# Patient Record
Sex: MALE | Race: WHITE | NOT HISPANIC OR LATINO | Employment: FULL TIME | URBAN - NONMETROPOLITAN AREA
[De-identification: names, ages, dates, MRNs, and addresses within clinical notes are randomized per-mention and may not be internally consistent; named-entity substitution may affect disease eponyms.]

---
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
## 2023-03-17 ENCOUNTER — OFFICE VISIT (OUTPATIENT)
Dept: FAMILY MEDICINE CLINIC | Facility: CLINIC | Age: 18
End: 2023-03-17
Payer: MEDICAID

## 2023-03-17 VITALS
TEMPERATURE: 96 F | BODY MASS INDEX: 31.23 KG/M2 | DIASTOLIC BLOOD PRESSURE: 72 MMHG | WEIGHT: 211.5 LBS | OXYGEN SATURATION: 98 % | HEART RATE: 83 BPM | SYSTOLIC BLOOD PRESSURE: 138 MMHG

## 2023-03-17 DIAGNOSIS — G47.39 OTHER SLEEP APNEA: Primary | ICD-10-CM

## 2023-03-17 PROCEDURE — 99213 OFFICE O/P EST LOW 20 MIN: CPT | Performed by: STUDENT IN AN ORGANIZED HEALTH CARE EDUCATION/TRAINING PROGRAM

## 2023-03-17 PROCEDURE — 1160F RVW MEDS BY RX/DR IN RCRD: CPT | Performed by: STUDENT IN AN ORGANIZED HEALTH CARE EDUCATION/TRAINING PROGRAM

## 2023-03-17 PROCEDURE — 1159F MED LIST DOCD IN RCRD: CPT | Performed by: STUDENT IN AN ORGANIZED HEALTH CARE EDUCATION/TRAINING PROGRAM

## 2023-03-17 NOTE — PROGRESS NOTES
Family Medicine Residency  Shaheed Valentin MD    Subjective:     Blaine Reyes is a 17 y.o. male who presents for partial seizure rule out and sleep apnea.    On 12 th March 2023 at 0400hrs, pt was snoring and breathing heavy. Pt partner tried wake patient and he woke up a couple of minutes later with no adverse affects. Pt reports in his dream and felt he was having a shaking event. Possibility of night terror or nightmare. Pt has no history of seizure and patient does not feel that this was a seizure. Wife has concern that he was having an event. However is in agreement that this maybe a bad dream. Pt and wife have concern for sleep apnea. Pt and his wife would like referral onto Sleep for review.  This is the first time any incident of this nature has happened.  The patient and wife are not sure whether it was a seizure or sleep related incident. Pt denies fever, headache, dizziness, no LOC.  Patient denies use of alcohol or other substances.    Patient's wife and patient would like this incident documented just in case patient has symptoms like this happen in the future and they would like referral onto sleep study for sleep apnea today.    Patient started a new job working welding.  Educated patient in relation to safety associated with his job type    The following portions of the patient's history were reviewed and updated as appropriate: allergies, current medications, past family history, past medical history, past social history, past surgical history and problem list.    Past Medical Hx:  Past Medical History:   Diagnosis Date   • ADHD (attention deficit hyperactivity disorder)    • Allergic rhinitis    • Allergic rhinitis due to pollen    • Bacterial infection of skin     upper lip   • Bronchitis    • Cellulitis of earlobe    • Common cold    • Contact dermatitis    • Cough    • Diarrhea    • Encounter for removal of sutures    • Ingrown toenail    • Knee pain, right    • Laceration of sole of  foot    • Nasal congestion    • Nausea & vomiting    • Obstructive sleep apnea syndrome    • Open wound of toe with complication    • Pain in throat    • Streptococcal pharyngitis    • Tick bite    • Upper respiratory infection    • Vomiting        Past Surgical Hx:  Past Surgical History:   Procedure Laterality Date   • ADENOIDECTOMY     • TONSILLECTOMY     • TONSILLECTOMY AND ADENOIDECTOMY  04/26/2010       Current Meds:    Current Outpatient Medications:   •  brompheniramine-pseudoephedrine-DM (Bromfed DM) 30-2-10 MG/5ML syrup, Take 5 mL by mouth Every 4 (Four) Hours As Needed for Allergies (during the day)., Disp: 120 mL, Rfl: 0  •  loperamide (IMODIUM A-D) 2 MG tablet, Take 1 tablet by mouth 4 (Four) Times a Day As Needed for Diarrhea., Disp: 30 tablet, Rfl: 0  •  ondansetron ODT (ZOFRAN-ODT) 4 MG disintegrating tablet, Place 1 tablet on the tongue Every 8 (Eight) Hours As Needed for Nausea or Vomiting., Disp: 15 tablet, Rfl: 0    Allergies:  Allergies   Allergen Reactions   • Latex        Family Hx:  Family History   Problem Relation Age of Onset   • Cancer Other    • Diabetes Other    • Heart disease Other    • Hypertension Mother    • Depression Mother    • Hypertension Father    • No Known Problems Sister    • No Known Problems Sister    • No Known Problems Brother         Social History:  Social History     Socioeconomic History   • Marital status: Single   Tobacco Use   • Smoking status: Never   • Smokeless tobacco: Never   Substance and Sexual Activity   • Alcohol use: No   • Drug use: No   • Sexual activity: Defer       Review of Systems  Review of Systems   Constitutional: Negative for activity change, appetite change, chills, diaphoresis, fatigue and fever.   HENT: Negative for congestion, dental problem, ear discharge, ear pain, hearing loss, mouth sores, nosebleeds, postnasal drip, sinus pain, sore throat, tinnitus, trouble swallowing and voice change.    Eyes: Negative for photophobia, pain,  discharge and itching.   Respiratory: Negative for cough, choking, chest tightness, shortness of breath and wheezing.    Cardiovascular: Negative for chest pain, palpitations and leg swelling.   Gastrointestinal: Negative for abdominal distention, abdominal pain, blood in stool, constipation, diarrhea, nausea and vomiting.   Endocrine: Negative for cold intolerance and heat intolerance.   Genitourinary: Negative for difficulty urinating, dysuria, flank pain and hematuria.   Musculoskeletal: Negative for back pain, joint swelling and neck pain.   Skin: Negative for color change and rash.   Neurological: Negative for dizziness, tremors, seizures, weakness, light-headedness, numbness and headaches.   Hematological: Negative for adenopathy.   Psychiatric/Behavioral: Negative for behavioral problems, confusion, hallucinations, self-injury and sleep disturbance.       Objective:     /72   Pulse 83   Temp 96 °F (35.6 °C)   Wt 95.9 kg (211 lb 8 oz)   SpO2 98%   BMI 31.23 kg/m²   Physical Exam  Vitals and nursing note reviewed.   Constitutional:       Appearance: Normal appearance. He is not ill-appearing or diaphoretic.   HENT:      Head: Normocephalic and atraumatic.      Right Ear: External ear normal.      Left Ear: External ear normal.      Nose: Nose normal. No rhinorrhea.      Mouth/Throat:      Mouth: Mucous membranes are moist.      Pharynx: No posterior oropharyngeal erythema.   Eyes:      General: No scleral icterus.        Right eye: No discharge.         Left eye: No discharge.      Extraocular Movements: Extraocular movements intact.   Neck:      Vascular: No carotid bruit.   Cardiovascular:      Rate and Rhythm: Normal rate and regular rhythm.      Pulses: Normal pulses.      Heart sounds: Normal heart sounds.     No gallop.   Pulmonary:      Effort: Pulmonary effort is normal.      Breath sounds: Normal breath sounds. No wheezing.   Chest:      Chest wall: No tenderness.   Abdominal:      General:  Bowel sounds are normal.      Palpations: Abdomen is soft.      Tenderness: There is no rebound.   Musculoskeletal:         General: No swelling.      Cervical back: No muscular tenderness.      Right lower leg: No edema.      Left lower leg: No edema.   Lymphadenopathy:      Cervical: No cervical adenopathy.   Skin:     General: Skin is warm and dry.      Capillary Refill: Capillary refill takes less than 2 seconds.      Findings: No erythema or rash.   Neurological:      General: No focal deficit present.      Mental Status: He is alert and oriented to person, place, and time.      Motor: No weakness.   Psychiatric:         Mood and Affect: Mood normal.         Behavior: Behavior normal.         Thought Content: Thought content normal.         Judgment: Judgment normal.          Assessment   Blaine Reyes is a 17 y.o. male who presents for:    Sleep apnea:  Patient referred on to sleep study 3/17/2023  Educated patient relation to sleep apnea.    Possible partial complex seizure:  Educated patient and patient's wife in the signs of a partial complex seizure  Educated patient and patient's wife in the plan of care should this incident occur  Educated patient in relation to plan should he have a seizure     Patient and patient's wife fully understood the plan of care at this point of time.  Patient and patient's wife happy with this plan of care at this point in time and have no concerns.    New employment status:  Patient started a new job working welding.  Educated patient in relation to safety associated with his job type.     The patient was educated on the risks, benefits and alternatives to therapy. The patient was in agreement with therapy and plan of care.    Plan     Next visit Annual physical  Next visit check if attended sleep study  Next visit if patient reports seizure-like activity referral to neurology  If in the future has seizure-like activity order MRI brain  Next visit check patients  medication regime.  Next visit check patients compliance to medication regime.    Diagnoses and all orders for this visit:    1. Other sleep apnea (Primary)  -     Ambulatory Referral to Sleep Medicine      · Rx changes: As above  · Patient Education: Advised and educated the patient in relation to diet, lifestyle, healthcare goals and educated in relation to sleep apnea and seizures.  · Compliance at present is estimated to be good.   · Efforts to improve compliance (if necessary) will be directed at increased exercise.    Depression screening: Depression screening performed today; result negative; no follow up needed       PHQ-2 Depression Screening  Little interest or pleasure in doing things? 0-->not at all   Feeling down, depressed, or hopeless? 0-->not at all   PHQ-2 Total Score 0        PHQ-2 Total Score: 0      PHQ-9 Depression Screening  Little interest or pleasure in doing things? 0-->not at all   Feeling down, depressed, or hopeless? 0-->not at all   Trouble falling or staying asleep, or sleeping too much?     Feeling tired or having little energy?     Poor appetite or overeating?     Feeling bad about yourself - or that you are a failure or have let yourself or your family down?     Trouble concentrating on things, such as reading the newspaper or watching television?     Moving or speaking so slowly that other people could have noticed? Or the opposite - being so fidgety or restless that you have been moving around a lot more than usual?     Thoughts that you would be better off dead, or of hurting yourself in some way?     PHQ-9 Total Score 0   If you checked off any problems, how difficult have these problems made it for you to do your work, take care of things at home, or get along with other people?        PHQ-9 Total Score: 0      Patient has no suicidal, no homicidal ideation.    Patient has been offered psychiatric and psychological therapy input however has refused.    Follow-up:     Return in  about 3 weeks (around 4/7/2023) for Annual.    Preventative:  Health Maintenance   Topic Date Due   • ANNUAL PHYSICAL  08/02/2019   • INFLUENZA VACCINE  03/31/2023 (Originally 8/1/2022)   • COVID-19 Vaccine (1) 06/14/2023 (Originally 6/16/2004)   • HEPATITIS C SCREENING  06/23/2023 (Originally 4/27/2017)   • HPV VACCINES (2 - Male 2-dose series) 06/23/2023 (Originally 2/2/2019)   • TDAP/TD VACCINES (2 - Td or Tdap) 09/03/2025   • Pneumococcal Vaccine 0-64  Aged Out   • MENINGOCOCCAL VACCINE  Aged Out     Male Preventative: Patient does Testicular self exam  Recommended: none  Vaccine Counseling: N/A    Weight  -Class: Obese Class I: 30-34.9kg/m2  -BMI is >= 30 and <35. (Class 1 Obesity). The following options were offered after discussion;: weight loss educational material (shared in after visit summary), exercise counseling/recommendations, nutrition counseling/recommendations, pharmacological intervention options and referral to a nutritionist   eat more fruits and vegetables, decrease soda or juice intake, increase water intake, increase physical activity, reduce screen time, reduce portion size, cut out extra servings and reduce fast food intake    Alcohol use:  reports no history of alcohol use.  Nicotine status  reports that he has never smoked. He has never used smokeless tobacco.     Goals    None         RISK SCORE: 1    Aníbal request # [id number], Aníbal out of service.     Signature  [place] [address]  Office: [phone]      This document has been electronically signed by Shaheed Valentin MD on March 17, 2023 10:40 CDT      Portions of this note were copied from progress note written by Dr. Valentin; note reviewed and updated as appropriate.    Part of this note may be an electronic transcription/translation of spoken language to printed text using the Dragon Dictation System.

## 2023-03-17 NOTE — PROGRESS NOTES
I have spoken with the patient .  I have reviewed the notes, assessments, and/or procedures performed by Dr. Shaheed Valentin,   I concur with   his  documentation and assessment and plan for Blaine Reyes.          This document has been electronically signed by Rui Holloway MD on March 17, 2023 11:01 CDT

## 2023-04-11 ENCOUNTER — OFFICE VISIT (OUTPATIENT)
Dept: SLEEP MEDICINE | Facility: HOSPITAL | Age: 18
End: 2023-04-11
Payer: MEDICAID

## 2023-04-11 VITALS
DIASTOLIC BLOOD PRESSURE: 91 MMHG | HEIGHT: 69 IN | WEIGHT: 217 LBS | HEART RATE: 71 BPM | OXYGEN SATURATION: 98 % | SYSTOLIC BLOOD PRESSURE: 154 MMHG | BODY MASS INDEX: 32.14 KG/M2

## 2023-04-11 DIAGNOSIS — G47.19 EXCESSIVE DAYTIME SLEEPINESS: ICD-10-CM

## 2023-04-11 DIAGNOSIS — G25.81 RESTLESS LEGS SYNDROME: ICD-10-CM

## 2023-04-11 DIAGNOSIS — R06.83 SNORING: Primary | ICD-10-CM

## 2023-04-11 DIAGNOSIS — G47.00 FREQUENT NOCTURNAL AWAKENING: ICD-10-CM

## 2023-04-11 PROCEDURE — 1159F MED LIST DOCD IN RCRD: CPT | Performed by: NURSE PRACTITIONER

## 2023-04-11 PROCEDURE — 1160F RVW MEDS BY RX/DR IN RCRD: CPT | Performed by: NURSE PRACTITIONER

## 2023-04-11 PROCEDURE — 99213 OFFICE O/P EST LOW 20 MIN: CPT | Performed by: NURSE PRACTITIONER

## 2023-04-11 NOTE — PROGRESS NOTES
New Patient Sleep Medicine Consultation    Encounter Date: 4/11/2023         Patient's Primary Care Provider: Shaheed Valentin MD  Referring Provider: Shaheed Valentin MD  Reason for consultation/chief complaint: snoring, excessive daytime sleepiness and unrefreshing sleep    Blaine Reyes is a 17 y.o. male who admits to snoring, unrestful sleep, working night shift or rotating shifts, excessive daytime sleepiness, morning headaches, sleeping less than 6 hours per night, up to bathroom at night, restless legs at night and difficulty falling asleep.    He denies cataplexy, sleep paralysis, or hypnagogic hallucinations. His bedtime is ~ 0100. He  falls asleep after 1-5 minutes, and is up 1-2 times per night. He wakes up ~ 6129-4849. He endorses 6-8 hours of sleep. He drinks 0 cups of coffee, 2-3 teas, and 2-3 sodas per day. He drinks 0 alcoholic beverages per week. He is not a current smoker. He does not take sedatives or hypnotics. He has no sleepiness with driving. He does not nap.     Harwood - 9  Harwood Sleepiness Scale  Sitting and reading: Moderate chance of dozing  Watching TV: Moderate chance of dozing  Sitting, inactive in a public place (e.g. a theatre or a meeting): Would never doze  As a passenger in a car for an hour without a break: Slight chance of dozing  Lying down to rest in the afternoon when circumstances permit: High chance of dozing  Sitting and talking to someone: Would never doze  Sitting quietly after a lunch without alcohol: Slight chance of dozing  In a car, while stopped for a few minutes in traffic: Would never doze  Total score: 9    Prior Sleep Testing: None    Past Medical History:   Diagnosis Date   • ADHD (attention deficit hyperactivity disorder)    • Allergic rhinitis    • Allergic rhinitis due to pollen    • Bacterial infection of skin     upper lip   • Bronchitis    • Cellulitis of earlobe    • Common cold    • Contact dermatitis    • Cough    • Diarrhea    • Encounter  for removal of sutures    • Ingrown toenail    • Knee pain, right    • Laceration of sole of foot    • Nasal congestion    • Nausea & vomiting    • Obstructive sleep apnea syndrome    • Open wound of toe with complication    • Pain in throat    • Streptococcal pharyngitis    • Tick bite    • Upper respiratory infection    • Vomiting      Social History     Socioeconomic History   • Marital status: Single   Tobacco Use   • Smoking status: Never   • Smokeless tobacco: Never   Substance and Sexual Activity   • Alcohol use: No   • Drug use: No   • Sexual activity: Defer     Family History   Problem Relation Age of Onset   • Cancer Other    • Diabetes Other    • Heart disease Other    • Hypertension Mother    • Depression Mother    • Hypertension Father    • No Known Problems Sister    • No Known Problems Sister    • No Known Problems Brother      Prior T&A, UPPP, maxillofacial, or bariatric surgery: Tonsillectomy, adenoidectomy  Family history of sleep disorders: Maternal grandmother - ?ANNA  Other family history + for: As above  Occupation: [employer]  Marital status: Unmarried  Has 0 brothers and 2 sisters  Smoking history: smoked never    Review of Systems:  Constitutional: positive for fatigue  Ears, nose, mouth, throat, and face: negative  Respiratory: negative  Cardiovascular: negative  Gastrointestinal: negative  Genitourinary:negative  Musculoskeletal:negative  Neurological: negative  Behavioral/Psych: positive for fatigue and sleep disturbance  Patient advised to discuss any positive ROS with PCP.      Vitals:    04/11/23 1409   BP: 154/91   Pulse: 71   SpO2: 98%           04/11/23  1409   Weight: 98.4 kg (217 lb)       Body mass index is 32.03 kg/m². BMI is >= 30 and <35. (Class 1 Obesity). The following options were offered after discussion;: referral to primary care    Tobacco Use: Low Risk    • Smoking Tobacco Use: Never   • Smokeless Tobacco Use: Never   • Passive Exposure: Not on file       Physical Exam:       "  General: Alert. Cooperative. Well developed. No acute distress.   Head/Neck:  Normocephalic. Symmetrical. Atraumatic.     Neck circumference: 17\"             Eyes: Sclera clear. No icterus. PERRLA. Normal EOM.             Ears: No deformities. Normal hearing.             Nose: No septal deviation. No drainage.          Throat: No oral lesions. No thrush. Moist mucous membranes. Trachea midline.    Tongue is normal     Dentition is good       Pharynx: Posterior pharyngeal pillars are narrow    Mallampati score of II (hard and soft palate, upper portion of tonsils anduvula visible)    Pharynx is nonerythematous, with both tonsils absent   Chest Wall:  Normal shape. Symmetric expansion with respiration. No tenderness.          Lungs:  Clear to auscultation bilaterally. No wheezes. No rhonchi. No rales. Respirations regular, even and unlabored.            Heart:  Regular rhythm and normal rate. Normal S1 and S2. No murmur.     Abdomen:  Soft, non-tender and non-distended. Normal bowel sounds. No masses.  Extremities:  Moves all extremities well. No edema.           Pulses: Pulses palpable and equal bilaterally.               Skin: Dry. Intact. No bleeding. No rash.           Neuro: Moves all 4 extremities and cranial nerves grossly intact.  Psychiatric: Normal mood and affect.      Current Outpatient Medications:   •  brompheniramine-pseudoephedrine-DM (Bromfed DM) 30-2-10 MG/5ML syrup, Take 5 mL by mouth Every 4 (Four) Hours As Needed for Allergies (during the day). (Patient not taking: Reported on 4/11/2023), Disp: 120 mL, Rfl: 0  •  loperamide (IMODIUM A-D) 2 MG tablet, Take 1 tablet by mouth 4 (Four) Times a Day As Needed for Diarrhea. (Patient not taking: Reported on 4/11/2023), Disp: 30 tablet, Rfl: 0  •  ondansetron ODT (ZOFRAN-ODT) 4 MG disintegrating tablet, Place 1 tablet on the tongue Every 8 (Eight) Hours As Needed for Nausea or Vomiting. (Patient not taking: Reported on 4/11/2023), Disp: 15 tablet, Rfl: " 0    WBC   Date Value Ref Range Status   02/04/2019 7.63 3.20 - 9.80 10*3/mm3 Final     RBC   Date Value Ref Range Status   02/04/2019 5.46 3.80 - 5.50 10*6/mm3 Final     Hemoglobin   Date Value Ref Range Status   02/04/2019 17.2 (H) 12.0 - 16.0 g/dL Final     Hematocrit   Date Value Ref Range Status   02/04/2019 48.3 36.0 - 50.0 % Final     MCV   Date Value Ref Range Status   02/04/2019 88.5 78.0 - 98.0 fL Final     MCH   Date Value Ref Range Status   02/04/2019 31.5 25.0 - 35.0 pg Final     MCHC   Date Value Ref Range Status   02/04/2019 35.6 31.0 - 37.0 g/dL Final     RDW   Date Value Ref Range Status   02/04/2019 13.0 11.5 - 14.5 % Final     RDW-SD   Date Value Ref Range Status   02/04/2019 41.9 35.1 - 43.9 fl Final     MPV   Date Value Ref Range Status   02/04/2019 11.4 8.0 - 12.0 fL Final     Platelets   Date Value Ref Range Status   02/04/2019 178 150 - 400 10*3/mm3 Final     Neutrophil %   Date Value Ref Range Status   02/04/2019 63.0 44.0 - 65.0 % Final     Lymphocyte %   Date Value Ref Range Status   02/04/2019 28.8 25.0 - 46.0 % Final     Monocyte %   Date Value Ref Range Status   02/04/2019 6.9 1.0 - 12.0 % Final     Eosinophil %   Date Value Ref Range Status   02/04/2019 1.0 0.0 - 9.0 % Final     Basophil %   Date Value Ref Range Status   02/04/2019 0.3 0.0 - 2.0 % Final     Neutrophils, Absolute   Date Value Ref Range Status   02/04/2019 4.80 1.80 - 7.20 10*3/mm3 Final     Lymphocytes, Absolute   Date Value Ref Range Status   02/04/2019 2.20 1.70 - 4.40 10*3/mm3 Final     Monocytes, Absolute   Date Value Ref Range Status   02/04/2019 0.53 0.10 - 0.90 10*3/mm3 Final     Eosinophils, Absolute   Date Value Ref Range Status   02/04/2019 0.08 0.00 - 0.70 10*3/mm3 Final     Basophils, Absolute   Date Value Ref Range Status   02/04/2019 0.02 0.00 - 0.20 10*3/mm3 Final     Lab Results   Component Value Date    GLUCOSE 103 (H) 02/04/2019    BUN 16 02/04/2019    CREATININE 0.96 02/04/2019    BCR 16.7 02/04/2019     K 4.0 02/04/2019    CO2 28.0 02/04/2019    CALCIUM 9.5 02/04/2019    ALBUMIN 5.10 (H) 02/04/2019    BILITOT 2.7 (H) 02/04/2019    AST 26 02/04/2019    ALT 20 02/04/2019       Contraindications to home sleep test: none    ASSESSMENT:  1. Excessive daytime sleepiness, presumed obstructive sleep apnea - New (to me), additional work-up planned (4)  1. Check home sleep study (regular)  2. Follow up for results  3. Pertinent labs were reviewed as listed above  2. Snoring, presumed obstructive sleep apnea - New (to me), additional work-up planned (4)  1. As above  3. Frequent nocturnal awakenings - New (to me), additional work-up planned (4)  1. As above  4. Restless leg syndrome/ Periodic limb movement disorder - (RLS/PLMD) - New (to me), additional work-up planned (4)  1. Address after further testing  2. Consider iron/ferritin level, consider Requip or Mirapex if iron/ferritin within range      I spent 30 minutes caring for Blaine on this date of service. This time includes time spent by me in the following activities: preparing for the visit, reviewing tests, obtaining and/or reviewing a separately obtained history, performing a medically appropriate examination and/or evaluation , counseling and educating the patient/family/caregiver, ordering medications, tests, or procedures, documenting information in the medical record and care coordination; discussing Further testing    RTC 2 weeks after testing. Patient agrees to return sooner if changes in symptoms.         This document has been electronically signed by POORNIMA Mancera on April 11, 2023 14:21 CDT          CC: Shaheed Valentin MD O'Reilly, Eric A, MD

## 2023-05-15 ENCOUNTER — HOSPITAL ENCOUNTER (OUTPATIENT)
Dept: SLEEP MEDICINE | Facility: HOSPITAL | Age: 18
Discharge: HOME | End: 2023-05-15
Admitting: NURSE PRACTITIONER
Payer: MEDICAID

## 2023-05-15 DIAGNOSIS — G47.19 EXCESSIVE DAYTIME SLEEPINESS: ICD-10-CM

## 2023-05-15 DIAGNOSIS — G47.00 FREQUENT NOCTURNAL AWAKENING: ICD-10-CM

## 2023-05-15 DIAGNOSIS — R06.83 SNORING: ICD-10-CM

## 2023-05-15 PROCEDURE — 95800 SLP STDY UNATTENDED: CPT

## 2023-05-25 ENCOUNTER — DOCUMENTATION (OUTPATIENT)
Dept: FAMILY MEDICINE CLINIC | Facility: CLINIC | Age: 18
End: 2023-05-25
Payer: MEDICAID

## 2023-05-25 NOTE — PROGRESS NOTES
Time of phone call: 16:23 CDT 5/25/2023       Blaine Reyes is a 17 y.o.  male  who I contacted in relation to their recent results.  The patient was unavailable by phone and was available by voicemail, pt has follow up appointment on 5/30/2023.       Signature  [place] [address]  Office: [phone]

## 2023-06-02 ENCOUNTER — OFFICE VISIT (OUTPATIENT)
Dept: FAMILY MEDICINE CLINIC | Facility: CLINIC | Age: 18
End: 2023-06-02

## 2023-06-02 VITALS
DIASTOLIC BLOOD PRESSURE: 86 MMHG | HEART RATE: 91 BPM | TEMPERATURE: 97.5 F | HEIGHT: 69 IN | BODY MASS INDEX: 31.7 KG/M2 | OXYGEN SATURATION: 98 % | SYSTOLIC BLOOD PRESSURE: 132 MMHG | WEIGHT: 214 LBS

## 2023-06-02 DIAGNOSIS — Z71.2 ENCOUNTER TO DISCUSS TEST RESULTS: Primary | ICD-10-CM

## 2023-06-02 NOTE — PROGRESS NOTES
Family Medicine Residency  Shaheed Valentin MD    Subjective:     Blaine Reyes is a 17 y.o. male who presents for polysomnography results discussion.  Overall patient is stable today.  VSS.  Patient has no concerns.  Patient started new job and overall is happy with home life and in his work.     The following portions of the patient's history were reviewed and updated as appropriate: allergies, current medications, past family history, past medical history, past social history, past surgical history and problem list.    Past Medical Hx:  Past Medical History:   Diagnosis Date   • ADHD (attention deficit hyperactivity disorder)    • Allergic rhinitis    • Allergic rhinitis due to pollen    • Bacterial infection of skin     upper lip   • Bronchitis    • Cellulitis of earlobe    • Common cold    • Contact dermatitis    • Cough    • Diarrhea    • Encounter for removal of sutures    • Ingrown toenail    • Knee pain, right    • Laceration of sole of foot    • Nasal congestion    • Nausea & vomiting    • Obstructive sleep apnea syndrome    • Open wound of toe with complication    • Pain in throat    • Streptococcal pharyngitis    • Tick bite    • Upper respiratory infection    • Vomiting        Past Surgical Hx:  Past Surgical History:   Procedure Laterality Date   • ADENOIDECTOMY     • TONSILLECTOMY     • TONSILLECTOMY AND ADENOIDECTOMY  04/26/2010       Current Meds:  No current outpatient medications on file.    Allergies:  Allergies   Allergen Reactions   • Latex        Family Hx:  Family History   Problem Relation Age of Onset   • Cancer Other    • Diabetes Other    • Heart disease Other    • Hypertension Mother    • Depression Mother    • Hypertension Father    • No Known Problems Sister    • No Known Problems Sister    • No Known Problems Brother         Social History:  Social History     Socioeconomic History   • Marital status: Single   Tobacco Use   • Smoking status: Never   • Smokeless tobacco: Never  "  Substance and Sexual Activity   • Alcohol use: No   • Drug use: No   • Sexual activity: Defer       Review of Systems  Review of Systems   Constitutional: Negative for activity change, appetite change, chills, diaphoresis, fatigue and fever.   HENT: Negative for congestion, dental problem, ear discharge, ear pain, hearing loss, mouth sores, nosebleeds, postnasal drip, sinus pain, sore throat, tinnitus, trouble swallowing and voice change.    Eyes: Negative for photophobia, pain, discharge and itching.   Respiratory: Negative for cough, choking, chest tightness, shortness of breath and wheezing.    Cardiovascular: Negative for chest pain, palpitations and leg swelling.   Gastrointestinal: Negative for abdominal distention, abdominal pain, blood in stool, constipation, diarrhea, nausea and vomiting.   Endocrine: Negative for cold intolerance and heat intolerance.   Genitourinary: Negative for difficulty urinating, dysuria, flank pain and hematuria.   Musculoskeletal: Negative for back pain, joint swelling and neck pain.   Skin: Negative for color change and rash.   Neurological: Negative for dizziness, tremors, seizures, weakness, light-headedness, numbness and headaches.   Hematological: Negative for adenopathy.   Psychiatric/Behavioral: Negative for behavioral problems, confusion, hallucinations, self-injury and sleep disturbance.       Objective:     /86   Pulse 91   Temp 97.5 °F (36.4 °C)   Ht 175.3 cm (69\")   Wt 97.1 kg (214 lb)   SpO2 98%   BMI 31.60 kg/m²   Physical Exam  Vitals and nursing note reviewed.   Constitutional:       Appearance: Normal appearance. He is not ill-appearing or diaphoretic.   HENT:      Head: Normocephalic and atraumatic.      Right Ear: External ear normal.      Left Ear: External ear normal.      Nose: Nose normal. No rhinorrhea.      Mouth/Throat:      Mouth: Mucous membranes are moist.      Pharynx: No posterior oropharyngeal erythema.   Eyes:      General: No scleral " icterus.        Right eye: No discharge.         Left eye: No discharge.      Extraocular Movements: Extraocular movements intact.   Neck:      Vascular: No carotid bruit.   Cardiovascular:      Rate and Rhythm: Normal rate and regular rhythm.      Pulses: Normal pulses.      Heart sounds: Normal heart sounds.     No gallop.   Pulmonary:      Effort: Pulmonary effort is normal.      Breath sounds: Normal breath sounds. No wheezing.   Chest:      Chest wall: No tenderness.   Abdominal:      General: Bowel sounds are normal.      Palpations: Abdomen is soft.      Tenderness: There is no rebound.   Musculoskeletal:         General: No swelling.      Cervical back: No muscular tenderness.      Right lower leg: No edema.      Left lower leg: No edema.   Lymphadenopathy:      Cervical: No cervical adenopathy.   Skin:     General: Skin is warm and dry.      Capillary Refill: Capillary refill takes less than 2 seconds.      Findings: No erythema or rash.   Neurological:      General: No focal deficit present.      Mental Status: He is alert and oriented to person, place, and time.      Motor: No weakness.   Psychiatric:         Mood and Affect: Mood normal.         Behavior: Behavior normal.         Thought Content: Thought content normal.         Judgment: Judgment normal.          Assessment   Blaine Reyes is a 17 y.o. male who presents for:    Polysomnography Results discussion:  Discussed the following results:  Diagnostic Analysis:  --Total Sleep Time: 07 hours, 58 min  --Respiratory Indices:  -->  pRDI (4%): 0.5 / hr  -->  pAHI (4%): 0.1 / hr  -->  KALEB: 0.1 / hr  -->  pAHI-Central: 00 / hr  --> % :  00 %  --Oxygen Saturation (SpO2):  --> Averaged 97% during the study.   --> Matt of 93% during sleep.   --Pulse:  -Averaged 63 bpm   -Range: 41 bpm -- 100 bpm.   --Time spent supine: 94%  --Snoring occupied 09% total sleep time.     Educated the patient on results and plan with sleep hygiene.    The patient was  educated on the risks, benefits and alternatives to therapy. The patient was in agreement with therapy and plan of care.    Plan     Next visit Annual physical     Diagnoses and all orders for this visit:    1. Encounter to discuss test results (Primary)      · Rx changes: As above  · Patient Education: Advised and educated the patient in relation to diet, lifestyle, healthcare goals and educated in relation to sleep study.  · Compliance at present is estimated to be good.   · Efforts to improve compliance (if necessary) will be directed at increased exercise.    Depression screening: Depression screening performed today; result negative; no follow up needed       PHQ-2 Depression Screening  Little interest or pleasure in doing things?  0   Feeling down, depressed, or hopeless?  0   PHQ-2 Total Score  0        PHQ-2 Total Score:   0    Patient has no suicidal, no homicidal ideation.    Patient has been offered psychiatric and psychological therapy input however has refused.    Follow-up:     Return in about 3 weeks (around 6/23/2023) for Annual.    Preventative:  Health Maintenance   Topic Date Due   • ANNUAL PHYSICAL  08/02/2019   • COVID-19 Vaccine (1) 06/14/2023 (Originally 6/16/2004)   • HEPATITIS C SCREENING  06/23/2023 (Originally 4/27/2017)   • HPV VACCINES (2 - Male 2-dose series) 06/23/2023 (Originally 2/2/2019)   • INFLUENZA VACCINE  08/01/2023   • TDAP/TD VACCINES (2 - Td or Tdap) 09/03/2025   • Pneumococcal Vaccine 0-64  Aged Out   • MENINGOCOCCAL VACCINE  Aged Out     Male Preventative: Patient does Testicular self exam  Recommended: none  Vaccine Counseling: N/A    Weight  -Class: Obese Class I: 30-34.9kg/m2  -BMI is >= 30 and <35. (Class 1 Obesity). The following options were offered after discussion;: weight loss educational material (shared in after visit summary), exercise counseling/recommendations and nutrition counseling/recommendations   eat more fruits and vegetables, decrease soda or juice  intake, increase water intake, increase physical activity, reduce screen time, reduce portion size, cut out extra servings and reduce fast food intake    Alcohol use:  reports no history of alcohol use.  Nicotine status  reports that he has never smoked. He has never used smokeless tobacco.     Goals    None         RISK SCORE: 1    Aníbal request #[id number]. Reviewed and appropriate.     Signature  [place] [address]  Office: [phone]      This document has been electronically signed by Sahheed Valentin MD on June 2, 2023 09:45 CDT      Portions of this note were copied from progress note written by Dr. Valentin; note reviewed and updated as appropriate.    Part of this note may be an electronic transcription/translation of spoken language to printed text using the Dragon Dictation System.

## 2023-06-02 NOTE — PROGRESS NOTES
I have spoken with the patient .  I have reviewed the notes, assessments, and/or procedures performed by Dr. Shaheed Valentin,   I concur with   his  documentation and assessment and plan for Blaine Reyes.          This document has been electronically signed by Rui Holloway MD on June 2, 2023 10:44 CDT

## 2024-07-19 NOTE — TELEPHONE ENCOUNTER
----- Message from Anastasiia Grullon MD sent at 2/6/2019  7:54 AM CST -----  Labs are normal   There are no Wet Read(s) to document.